# Patient Record
Sex: FEMALE
[De-identification: names, ages, dates, MRNs, and addresses within clinical notes are randomized per-mention and may not be internally consistent; named-entity substitution may affect disease eponyms.]

---

## 2022-07-18 ENCOUNTER — HOSPITAL ENCOUNTER (EMERGENCY)
Dept: HOSPITAL 5 - ED | Age: 27
LOS: 1 days | Discharge: HOME | End: 2022-07-19
Payer: SELF-PAY

## 2022-07-18 DIAGNOSIS — Z91.09: ICD-10-CM

## 2022-07-18 DIAGNOSIS — J06.9: Primary | ICD-10-CM

## 2022-07-18 DIAGNOSIS — B34.9: ICD-10-CM

## 2022-07-18 DIAGNOSIS — Z91.040: ICD-10-CM

## 2022-07-18 DIAGNOSIS — R56.9: ICD-10-CM

## 2022-07-18 LAB
ALBUMIN SERPL-MCNC: 4.4 G/DL (ref 3.9–5)
ALT SERPL-CCNC: 16 UNITS/L (ref 7–56)
BILIRUB UR QL STRIP: (no result)
BLOOD UR QL VISUAL: (no result)
BUN SERPL-MCNC: 13 MG/DL (ref 7–17)
BUN/CREAT SERPL: 22 %
CALCIUM SERPL-MCNC: 9.2 MG/DL (ref 8.4–10.2)
HCT VFR BLD CALC: 38.2 % (ref 30.3–42.9)
HEMOLYSIS INDEX: 7
HGB BLD-MCNC: 13.3 GM/DL (ref 10.1–14.3)
MCHC RBC AUTO-ENTMCNC: 35 % (ref 30–34)
MCV RBC AUTO: 88 FL (ref 79–97)
MUCOUS THREADS #/AREA URNS HPF: (no result) /HPF
PH UR STRIP: 7 [PH] (ref 5–7)
PLATELET # BLD: 154 K/MM3 (ref 140–440)
PROT UR STRIP-MCNC: (no result) MG/DL
RBC # BLD AUTO: 4.34 M/MM3 (ref 3.65–5.03)
RBC #/AREA URNS HPF: 3 /HPF (ref 0–6)
UROBILINOGEN UR-MCNC: 4 MG/DL (ref ?–2)
WBC #/AREA URNS HPF: 1 /HPF (ref 0–6)

## 2022-07-18 PROCEDURE — 36415 COLL VENOUS BLD VENIPUNCTURE: CPT

## 2022-07-18 PROCEDURE — 99283 EMERGENCY DEPT VISIT LOW MDM: CPT

## 2022-07-18 PROCEDURE — 81001 URINALYSIS AUTO W/SCOPE: CPT

## 2022-07-18 PROCEDURE — 71046 X-RAY EXAM CHEST 2 VIEWS: CPT

## 2022-07-18 PROCEDURE — 80053 COMPREHEN METABOLIC PANEL: CPT

## 2022-07-18 PROCEDURE — 93005 ELECTROCARDIOGRAM TRACING: CPT

## 2022-07-18 PROCEDURE — 81025 URINE PREGNANCY TEST: CPT

## 2022-07-18 PROCEDURE — 85027 COMPLETE CBC AUTOMATED: CPT

## 2022-07-18 PROCEDURE — 99284 EMERGENCY DEPT VISIT MOD MDM: CPT

## 2022-07-18 NOTE — XRAY REPORT
CHEST 2 VIEWS 



INDICATION / CLINICAL INFORMATION:

cough.



COMPARISON: 

None available.



FINDINGS:



SUPPORT DEVICES: None.



HEART / MEDIASTINUM: No significant abnormality. 



LUNGS / PLEURA: No significant pulmonary or pleural abnormality. No pneumothorax. 



ADDITIONAL FINDINGS: No significant additional findings.



IMPRESSION:

1. No acute findings.



Signer Name: Roz Beckett MD 

Signed: 7/18/2022 10:02 PM

Workstation Name: [id number]

## 2022-07-19 VITALS — DIASTOLIC BLOOD PRESSURE: 72 MMHG | SYSTOLIC BLOOD PRESSURE: 133 MMHG

## 2022-07-19 NOTE — EMERGENCY DEPARTMENT REPORT
ED General Adult HPI





- General


Chief complaint: Headache


Stated complaint: SOB/HEADACHE/CONJESTION


Time Seen by Provider: 22 01:52


Source: patient


Mode of arrival: Ambulatory


Limitations: No Limitations





- History of Present Illness


Initial comments: 


Patient a 27-year-old female who presents for cough malaise fever x1 week cough 

is productive yellow.  Patient states nocturnal wheezing no wheezing at this 

time.  No shortness of breath.  There is been no nausea or vomiting.  Patient 

denies smoking.  Denies other history. pt is not COVID vaccinated.  Symptoms are

exacerbated by activity.  Symptoms are relieved by nothing tried.








- Related Data


                                  Previous Rx's











 Medication  Instructions  Recorded  Last Taken  Type


 


Acetaminophen [Acetaminophen TAB] 1,000 mg PO Q8H PRN #60 tab 22 Unknown 

Rx


 


Metoclopramide [Reglan] 10 mg PO TID PRN #30 tab 22 Unknown Rx


 


diphenhydrAMINE [Benadryl CAP] 25 mg PO Q8HR PRN #30 capsule 22 Unknown Rx











                                    Allergies











Allergy/AdvReac Type Severity Reaction Status Date / Time


 


Latex, Natural Rubber Allergy  Rash Verified 22 15:22


 


tramadol AdvReac  Seizure Verified 22 15:22














ED Review of Systems


ROS: 


Stated complaint: SOB/HEADACHE/CONJESTION


Other details as noted in HPI





Constitutional: chills, malaise


Eyes: denies: eye pain, eye discharge, vision change


ENT: ear pain, throat pain, congestion


Respiratory: cough, wheezing.  denies: shortness of breath


Cardiovascular: denies: chest pain, palpitations


Endocrine: no symptoms reported


Gastrointestinal: denies: abdominal pain, nausea, vomiting, diarrhea


Genitourinary: denies: urgency, dysuria, discharge


Musculoskeletal: denies: back pain, joint swelling, arthralgia


Skin: denies: rash, lesions


Neurological: headache.  denies: weakness, numbness, paresthesias, confusion, 

vertigo


Psychiatric: denies: anxiety, depression


Hematological/Lymphatic: denies: easy bleeding, easy bruising





ED Past Medical Hx





- Past Medical History


Hx Seizures: Yes


Additional medical history: Pulmonary embolism 2022





- Surgical History


Additional Surgical History:  x3





- Social History


Smoking Status: Never Smoker





- Medications


Home Medications: 


                                Home Medications











 Medication  Instructions  Recorded  Confirmed  Last Taken  Type


 


Acetaminophen [Acetaminophen TAB] 1,000 mg PO Q8H PRN #60 tab 22  Unknown 

Rx


 


Metoclopramide [Reglan] 10 mg PO TID PRN #30 tab 22  Unknown Rx


 


diphenhydrAMINE [Benadryl CAP] 25 mg PO Q8HR PRN #30 capsule 22  Unknown 

Rx














ED Physical Exam





- General


Limitations: No Limitations


General appearance: alert, in no apparent distress





- Head


Head exam: Present: normocephalic, normal inspection





- Eye


Eye exam: Present: PERRL, EOMI.  Absent: conjunctival injection, nystagmus


Pupils: Present: normal accommodation





- ENT


ENT exam: Present: normal orophraynx, mucous membranes moist, TM's normal 

bilaterally, normal external ear exam





- Neck


Neck exam: Present: normal inspection, full ROM.  Absent: tenderness, 

meningismus, lymphadenopathy, thyromegaly





- Respiratory


Respiratory exam: Present: normal lung sounds bilaterally.  Absent: respiratory 

distress, wheezes, stridor, chest wall tenderness





- Cardiovascular


Cardiovascular Exam: Present: regular rate, normal rhythm, normal heart sounds. 

Absent: systolic murmur, diastolic murmur, rubs, gallop





- GI/Abdominal


GI/Abdominal exam: Present: soft, normal bowel sounds.  Absent: distended, 

tenderness





- Rectal


Rectal exam: Present: deferred





- Extremities Exam


Extremities exam: Present: normal inspection, full ROM, normal capillary refill.

 Absent: tenderness, pedal edema





- Back Exam


Back exam: Present: normal inspection, full ROM.  Absent: CVA tenderness (R), 

CVA tenderness (L)





- Neurological Exam


Neurological exam: Present: alert, oriented X3, CN II-XII intact, normal gait





- Expanded Neurological Exam


  ** Expanded


Patient oriented to: Present: person, place, time


Speech: Present: fluid speech


Cranial nerves: EOM's Intact: Normal


Motor strength exam: RUE: 5, LUE: 5, RLE: 5, LLE: 5


Best Eye Response (Sudha): (4) open spontaneously


Best Motor Response (Sudha): (6) obeys commands


Best Verbal Response ([place]): (5) oriented


[place] Total: 15





- Psychiatric


Psychiatric exam: Present: normal affect, normal mood





- Skin


Skin exam: Present: warm, dry, intact, normal color.  Absent: rash





ED Course





                                   Vital Signs











  22





  15:16


 


Temperature 98.6 F


 


Pulse Rate 104 H


 


Respiratory 18





Rate 


 


Blood Pressure 113/62


 


O2 Sat by Pulse 95





Oximetry 














ED Medical Decision Making





- Lab Data


Result diagrams: 


                                 22 15:26





                                 22 15:26








Labs











  22





  15:26 15:26 16:45


 


WBC  8.1  


 


RBC  4.34  


 


Hgb  13.3  


 


Hct  38.2  


 


MCV  88  


 


MCH  31  


 


MCHC  35 H  


 


RDW  12.6 L  


 


Plt Count  154  


 


Sodium   140 


 


Potassium   3.8 


 


Chloride   104.7 


 


Carbon Dioxide   24 


 


Anion Gap   15 


 


BUN   13 


 


Creatinine   0.6 


 


Estimated GFR   > 60 


 


BUN/Creatinine Ratio   22 


 


Glucose   108 H 


 


Calcium   9.2 


 


Total Bilirubin   0.40 


 


AST   14 


 


ALT   16 


 


Alkaline Phosphatase   89 


 


Total Protein   7.3 


 


Albumin   4.4 


 


Albumin/Globulin Ratio   1.5 


 


Urine Color    Jeannette


 


Urine Turbidity    Slightly-cloudy


 


Urine pH    7.0


 


Ur Specific Gravity    1.026


 


Urine Protein    <15 mg/dl


 


Urine Glucose (UA)    Neg


 


Urine Ketones    Neg


 


Urine Blood    Neg


 


Urine Nitrite    Neg


 


Urine Bilirubin    Sm


 


Urine Ictotest    Negative


 


Urine Urobilinogen    4.0


 


Ur Leukocyte Esterase    Neg


 


Urine WBC (Auto)    1.0


 


Urine RBC (Auto)    3.0


 


U Epithel Cells (Auto)    49.0 H


 


Urine Mucus    2+


 


Urine HCG, Qual    Negative














- EKG Data


EKG shows normal: sinus rhythm, axis, intervals, QRS complexes, ST-T waves


Rate: normal





- EKG Data


Interpretation: normal EKG (Normal sinus rhythm no ST elevated MI interpreted by

ED attending.)





- Radiology Data


Radiology results: report reviewed, image reviewed


CHEST 2 VIEWS   


 


 INDICATION / CLINICAL INFORMATION:  


 cough.  


 


 COMPARISON:   


 None available.  


 


 FINDINGS:  


 


 SUPPORT DEVICES: None.  


 


 HEART / MEDIASTINUM: No significant abnormality.   


 


 LUNGS / PLEURA: No significant pulmonary or pleural abnormality. No 

pneumothorax.   


 


 ADDITIONAL FINDINGS: No significant additional findings.  


 


 IMPRESSION:  


 1. No acute findings.  


 


 Signer Name: Roz Beckett MD   


 Signed: 2022 10:02 PM  


 Workstation Name: [id number]   


 


 


Transcribed By:   


Dictated By: Roz Beckett MD  


Electronically Authenticated By: Roz Beckett MD    


Signed Date/Time: 22                                


 


 


 


DD/DT: 22                                                            

 


TD/TT:











- Medical Decision Making


Labs noted above, chest x-ray normal no infiltrates no opacities, EKG normal 

sinus rhythm no ST elevated MI.  This is likely viral syndrome.  Patient treated

for headache symptoms are improved plan DC to home with prescriptions.  Continue

to hydrate as directed.  Follow-up with your primary care doctor in 2 to 3 days.

 Patient verbalized agreement and understanding with discharge plan.  Patient 

DC'd home in stable condition at this time.,





Critical care attestation.: 


If time is entered above; I have spent that time in minutes in the direct care 

of this critically ill patient, excluding procedure time.








ED Disposition


Clinical Impression: 


 Viral illness





URI (upper respiratory infection)


Qualifiers:


 URI type: unspecified viral URI Qualified Code(s): J06.9 - Acute upper 

respiratory infection, unspecified





Disposition:  HOME / SELF CARE / HOMELESS


Is pt being admited?: No


Does the pt Need Aspirin: No


Condition: Stable


Instructions:  Viral Illness, Adult, Upper Respiratory Infection, Adult, 

Easy-to-Read


Additional Instructions: 


Take medications as prescribed, hydrate as directed.  Follow-up with your doctor

in 2 to 3 days.  Return to emergency department should symptoms worsen


Prescriptions: 


Acetaminophen [Acetaminophen TAB] 1,000 mg PO Q8H PRN #60 tab


 PRN Reason: Headache


diphenhydrAMINE [Benadryl CAP] 25 mg PO Q8HR PRN #30 capsule


 PRN Reason: headache


Metoclopramide [Reglan] 10 mg PO TID PRN #30 tab


 PRN Reason: headache


Referrals: 


ZAINAB BERRY MD [Primary Care Provider] - 3-5 Days


Forms:  Work/School Release Form(ED)


Time of Disposition: 02:12

## 2022-07-19 NOTE — ELECTROCARDIOGRAPH REPORT
[facility]

                                       

Test Date:    2022               Test Time:    15:47:24

Pat Name:     NIKO BETANCOURT            Department:   

Patient ID:   [id number]          Room:          

Gender:       F                        Technician:   MARIANNE

:          [id number]               Requested By: STORMY WEIR

Order Number: [id number]              Reading MD:   Cailin Tello

                                 Measurements

Intervals                              Axis          

Rate:         81                       P:            33

OR:           157                      QRS:          66

QRSD:         72                       T:            30

QT:           335                                    

QTc:          390                                    

                           Interpretive Statements

Sinus rhythm

No previous ECG available for comparison

Electronically Signed On 2022 18:54:18 EDT by Cailin Tello